# Patient Record
Sex: FEMALE | Race: BLACK OR AFRICAN AMERICAN | NOT HISPANIC OR LATINO | URBAN - METROPOLITAN AREA
[De-identification: names, ages, dates, MRNs, and addresses within clinical notes are randomized per-mention and may not be internally consistent; named-entity substitution may affect disease eponyms.]

---

## 2021-11-15 ENCOUNTER — OUTPATIENT (OUTPATIENT)
Dept: OUTPATIENT SERVICES | Facility: HOSPITAL | Age: 56
LOS: 1 days | End: 2021-11-15
Payer: COMMERCIAL

## 2021-11-15 VITALS
DIASTOLIC BLOOD PRESSURE: 70 MMHG | HEART RATE: 82 BPM | RESPIRATION RATE: 16 BRPM | TEMPERATURE: 97 F | WEIGHT: 158.07 LBS | OXYGEN SATURATION: 97 % | HEIGHT: 62 IN | SYSTOLIC BLOOD PRESSURE: 102 MMHG

## 2021-11-15 DIAGNOSIS — N65.1 DISPROPORTION OF RECONSTRUCTED BREAST: ICD-10-CM

## 2021-11-15 DIAGNOSIS — Z98.890 OTHER SPECIFIED POSTPROCEDURAL STATES: Chronic | ICD-10-CM

## 2021-11-15 DIAGNOSIS — Z90.3 ACQUIRED ABSENCE OF STOMACH [PART OF]: Chronic | ICD-10-CM

## 2021-11-15 DIAGNOSIS — L30.4 ERYTHEMA INTERTRIGO: ICD-10-CM

## 2021-11-15 DIAGNOSIS — Z01.818 ENCOUNTER FOR OTHER PREPROCEDURAL EXAMINATION: ICD-10-CM

## 2021-11-15 DIAGNOSIS — N62 HYPERTROPHY OF BREAST: ICD-10-CM

## 2021-11-15 LAB
A1C WITH ESTIMATED AVERAGE GLUCOSE RESULT: 7.7 % — HIGH (ref 4–5.6)
ANION GAP SERPL CALC-SCNC: 13 MMOL/L — SIGNIFICANT CHANGE UP (ref 5–17)
BUN SERPL-MCNC: 21 MG/DL — SIGNIFICANT CHANGE UP (ref 7–23)
CALCIUM SERPL-MCNC: 9.7 MG/DL — SIGNIFICANT CHANGE UP (ref 8.4–10.5)
CHLORIDE SERPL-SCNC: 103 MMOL/L — SIGNIFICANT CHANGE UP (ref 96–108)
CO2 SERPL-SCNC: 24 MMOL/L — SIGNIFICANT CHANGE UP (ref 22–31)
CREAT SERPL-MCNC: 0.66 MG/DL — SIGNIFICANT CHANGE UP (ref 0.5–1.3)
ESTIMATED AVERAGE GLUCOSE: 174 MG/DL — HIGH (ref 68–114)
GLUCOSE SERPL-MCNC: 80 MG/DL — SIGNIFICANT CHANGE UP (ref 70–99)
HCT VFR BLD CALC: 43.4 % — SIGNIFICANT CHANGE UP (ref 34.5–45)
HGB BLD-MCNC: 14 G/DL — SIGNIFICANT CHANGE UP (ref 11.5–15.5)
MCHC RBC-ENTMCNC: 30.1 PG — SIGNIFICANT CHANGE UP (ref 27–34)
MCHC RBC-ENTMCNC: 32.3 GM/DL — SIGNIFICANT CHANGE UP (ref 32–36)
MCV RBC AUTO: 93.3 FL — SIGNIFICANT CHANGE UP (ref 80–100)
NRBC # BLD: 0 /100 WBCS — SIGNIFICANT CHANGE UP (ref 0–0)
PLATELET # BLD AUTO: 343 K/UL — SIGNIFICANT CHANGE UP (ref 150–400)
POTASSIUM SERPL-MCNC: 4.5 MMOL/L — SIGNIFICANT CHANGE UP (ref 3.5–5.3)
POTASSIUM SERPL-SCNC: 4.5 MMOL/L — SIGNIFICANT CHANGE UP (ref 3.5–5.3)
RBC # BLD: 4.65 M/UL — SIGNIFICANT CHANGE UP (ref 3.8–5.2)
RBC # FLD: 12.2 % — SIGNIFICANT CHANGE UP (ref 10.3–14.5)
SODIUM SERPL-SCNC: 140 MMOL/L — SIGNIFICANT CHANGE UP (ref 135–145)
WBC # BLD: 5.11 K/UL — SIGNIFICANT CHANGE UP (ref 3.8–10.5)
WBC # FLD AUTO: 5.11 K/UL — SIGNIFICANT CHANGE UP (ref 3.8–10.5)

## 2021-11-15 PROCEDURE — 83036 HEMOGLOBIN GLYCOSYLATED A1C: CPT

## 2021-11-15 PROCEDURE — 80048 BASIC METABOLIC PNL TOTAL CA: CPT

## 2021-11-15 PROCEDURE — G0463: CPT

## 2021-11-15 PROCEDURE — 85027 COMPLETE CBC AUTOMATED: CPT

## 2021-11-15 RX ORDER — SODIUM CHLORIDE 9 MG/ML
3 INJECTION INTRAMUSCULAR; INTRAVENOUS; SUBCUTANEOUS EVERY 8 HOURS
Refills: 0 | Status: DISCONTINUED | OUTPATIENT
Start: 2021-11-29 | End: 2021-12-13

## 2021-11-15 RX ORDER — LIDOCAINE HCL 20 MG/ML
0.2 VIAL (ML) INJECTION ONCE
Refills: 0 | Status: DISCONTINUED | OUTPATIENT
Start: 2021-11-29 | End: 2021-12-13

## 2021-11-15 RX ORDER — CEFAZOLIN SODIUM 1 G
2000 VIAL (EA) INJECTION ONCE
Refills: 0 | Status: DISCONTINUED | OUTPATIENT
Start: 2021-11-29 | End: 2021-12-13

## 2021-11-15 NOTE — H&P PST ADULT - NSICDXPASTMEDICALHX_GEN_ALL_CORE_FT
PAST MEDICAL HISTORY:  HLD (hyperlipidemia)     Type 2 diabetes mellitus      PAST MEDICAL HISTORY:  HLD (hyperlipidemia)     Morbid obesity -s/p gastric sleeve    Type 2 diabetes mellitus

## 2021-11-15 NOTE — H&P PST ADULT - HISTORY OF PRESENT ILLNESS
56 year old female with PMH of T2DM, HLD Morbid Obesity, S/P Gastric Sleeve (2018, 50lb weight loss), COVID-19 infection (march 2021-mild symptoms)  She presents to PST today for evaluation prior to scheduled bilateral breast reduction, Panniculectomy on 11/29/2021  She denies fever, chills, nausea/vomiting, abdominal pain    preop covid screen 11/26 @ [facility] 56 year old female with PMH of T2DM, HLD, Morbid Obesity, S/P Gastric Sleeve (2018  with 50lb weight loss), COVID-19 infection (march 2021-mild symptoms) who presents to PST today for evaluation prior to scheduled bilateral breast reduction, Panniculectomy on 11/29/2021  She denies fever, chills, nausea/vomiting, abdominal pain    preop covid screen 11/26 @ [facility]

## 2021-11-15 NOTE — H&P PST ADULT - PROBLEM SELECTOR PLAN 1
Hypertrophy of Breast  -cbc, bmp, A1c done at [facility]  -medical evaluation --scheduled 11/15  -preop instructions  -instructed to hold metformin 24 hours prior to procedure  -advised 20% reduction in tresiba dose on 11/28  -fingerstick on admit

## 2021-11-15 NOTE — H&P PST ADULT - NSICDXPASTSURGICALHX_GEN_ALL_CORE_FT
PAST SURGICAL HISTORY:  H/O gastric sleeve -2018, 50lb weight loss    History of esophagogastroduodenoscopy (EGD)     S/P colonoscopy     Status post embolization of uterine artery

## 2021-11-15 NOTE — H&P PST ADULT - SYMPTOMS
Call to ED pharmacy and they said they don't have this specific medication and would need another order to be sent and go through another prior auth. Therefore I called the patient and ;et him know if he needs the medication soon it might be best to send to pharmacy in [place]. He said he will actually be in [place] so script was sent there instead.   denies/none

## 2021-11-17 PROBLEM — E78.5 HYPERLIPIDEMIA, UNSPECIFIED: Chronic | Status: ACTIVE | Noted: 2021-11-15

## 2021-11-17 PROBLEM — E11.9 TYPE 2 DIABETES MELLITUS WITHOUT COMPLICATIONS: Chronic | Status: ACTIVE | Noted: 2021-11-15

## 2021-11-17 PROBLEM — E66.01 MORBID (SEVERE) OBESITY DUE TO EXCESS CALORIES: Chronic | Status: ACTIVE | Noted: 2021-11-15

## 2021-11-26 ENCOUNTER — OUTPATIENT (OUTPATIENT)
Dept: OUTPATIENT SERVICES | Facility: HOSPITAL | Age: 56
LOS: 1 days | End: 2021-11-26
Payer: COMMERCIAL

## 2021-11-26 DIAGNOSIS — Z98.890 OTHER SPECIFIED POSTPROCEDURAL STATES: Chronic | ICD-10-CM

## 2021-11-26 DIAGNOSIS — Z11.52 ENCOUNTER FOR SCREENING FOR COVID-19: ICD-10-CM

## 2021-11-26 DIAGNOSIS — Z90.3 ACQUIRED ABSENCE OF STOMACH [PART OF]: Chronic | ICD-10-CM

## 2021-11-26 LAB — SARS-COV-2 RNA SPEC QL NAA+PROBE: SIGNIFICANT CHANGE UP

## 2021-11-26 PROCEDURE — C9803: CPT

## 2021-11-26 PROCEDURE — U0003: CPT

## 2021-11-26 PROCEDURE — U0005: CPT

## 2021-11-29 ENCOUNTER — OUTPATIENT (OUTPATIENT)
Dept: OUTPATIENT SERVICES | Facility: HOSPITAL | Age: 56
LOS: 1 days | End: 2021-11-29
Payer: COMMERCIAL

## 2021-11-29 VITALS
HEIGHT: 62 IN | RESPIRATION RATE: 18 BRPM | WEIGHT: 158.07 LBS | HEART RATE: 74 BPM | DIASTOLIC BLOOD PRESSURE: 70 MMHG | OXYGEN SATURATION: 98 % | TEMPERATURE: 98 F | SYSTOLIC BLOOD PRESSURE: 110 MMHG

## 2021-11-29 DIAGNOSIS — Z98.890 OTHER SPECIFIED POSTPROCEDURAL STATES: Chronic | ICD-10-CM

## 2021-11-29 DIAGNOSIS — N62 HYPERTROPHY OF BREAST: ICD-10-CM

## 2021-11-29 DIAGNOSIS — N65.1 DISPROPORTION OF RECONSTRUCTED BREAST: ICD-10-CM

## 2021-11-29 DIAGNOSIS — L30.4 ERYTHEMA INTERTRIGO: ICD-10-CM

## 2021-11-29 DIAGNOSIS — Z90.3 ACQUIRED ABSENCE OF STOMACH [PART OF]: Chronic | ICD-10-CM

## 2021-11-29 LAB
GLUCOSE BLDC GLUCOMTR-MCNC: 195 MG/DL — HIGH (ref 70–99)
GLUCOSE BLDC GLUCOMTR-MCNC: 235 MG/DL — HIGH (ref 70–99)
GLUCOSE BLDC GLUCOMTR-MCNC: 256 MG/DL — HIGH (ref 70–99)
GLUCOSE BLDC GLUCOMTR-MCNC: 70 MG/DL — SIGNIFICANT CHANGE UP (ref 70–99)

## 2021-11-29 PROCEDURE — 88305 TISSUE EXAM BY PATHOLOGIST: CPT | Mod: 26

## 2021-11-29 PROCEDURE — 88302 TISSUE EXAM BY PATHOLOGIST: CPT | Mod: 26

## 2021-11-29 RX ORDER — SODIUM CHLORIDE 9 MG/ML
1000 INJECTION, SOLUTION INTRAVENOUS
Refills: 0 | Status: DISCONTINUED | OUTPATIENT
Start: 2021-11-29 | End: 2021-12-13

## 2021-11-29 RX ORDER — FAMOTIDINE 10 MG/ML
20 INJECTION INTRAVENOUS ONCE
Refills: 0 | Status: COMPLETED | OUTPATIENT
Start: 2021-11-29 | End: 2021-11-29

## 2021-11-29 RX ORDER — HYDROMORPHONE HYDROCHLORIDE 2 MG/ML
1 INJECTION INTRAMUSCULAR; INTRAVENOUS; SUBCUTANEOUS ONCE
Refills: 0 | Status: DISCONTINUED | OUTPATIENT
Start: 2021-11-29 | End: 2021-11-29

## 2021-11-29 RX ORDER — CHLORHEXIDINE GLUCONATE 213 G/1000ML
1 SOLUTION TOPICAL ONCE
Refills: 0 | Status: COMPLETED | OUTPATIENT
Start: 2021-11-29 | End: 2021-11-29

## 2021-11-29 RX ORDER — SIMETHICONE 80 MG/1
80 TABLET, CHEWABLE ORAL
Refills: 0 | Status: DISCONTINUED | OUTPATIENT
Start: 2021-11-29 | End: 2021-12-13

## 2021-11-29 RX ORDER — DEXTROSE 50 % IN WATER 50 %
25 SYRINGE (ML) INTRAVENOUS ONCE
Refills: 0 | Status: DISCONTINUED | OUTPATIENT
Start: 2021-11-29 | End: 2021-12-13

## 2021-11-29 RX ORDER — SEMAGLUTIDE 0.68 MG/ML
1.5 INJECTION, SOLUTION SUBCUTANEOUS
Qty: 0 | Refills: 0 | DISCHARGE

## 2021-11-29 RX ORDER — APREPITANT 80 MG/1
40 CAPSULE ORAL ONCE
Refills: 0 | Status: COMPLETED | OUTPATIENT
Start: 2021-11-29 | End: 2021-11-29

## 2021-11-29 RX ORDER — ATORVASTATIN CALCIUM 80 MG/1
1 TABLET, FILM COATED ORAL
Qty: 0 | Refills: 0 | DISCHARGE

## 2021-11-29 RX ORDER — HYDROMORPHONE HYDROCHLORIDE 2 MG/ML
0.5 INJECTION INTRAMUSCULAR; INTRAVENOUS; SUBCUTANEOUS
Refills: 0 | Status: DISCONTINUED | OUTPATIENT
Start: 2021-11-29 | End: 2021-11-29

## 2021-11-29 RX ORDER — CEFAZOLIN SODIUM 1 G
1000 VIAL (EA) INJECTION EVERY 8 HOURS
Refills: 0 | Status: DISCONTINUED | OUTPATIENT
Start: 2021-11-29 | End: 2021-12-13

## 2021-11-29 RX ORDER — ENOXAPARIN SODIUM 100 MG/ML
40 INJECTION SUBCUTANEOUS ONCE
Refills: 0 | Status: COMPLETED | OUTPATIENT
Start: 2021-11-30 | End: 2021-11-30

## 2021-11-29 RX ORDER — ACETAMINOPHEN 500 MG
1000 TABLET ORAL ONCE
Refills: 0 | Status: COMPLETED | OUTPATIENT
Start: 2021-11-29 | End: 2021-11-29

## 2021-11-29 RX ORDER — ONDANSETRON 8 MG/1
4 TABLET, FILM COATED ORAL ONCE
Refills: 0 | Status: DISCONTINUED | OUTPATIENT
Start: 2021-11-29 | End: 2021-12-13

## 2021-11-29 RX ORDER — INSULIN LISPRO 100/ML
VIAL (ML) SUBCUTANEOUS AT BEDTIME
Refills: 0 | Status: DISCONTINUED | OUTPATIENT
Start: 2021-11-29 | End: 2021-12-13

## 2021-11-29 RX ORDER — METFORMIN HYDROCHLORIDE 850 MG/1
1 TABLET ORAL
Qty: 0 | Refills: 0 | DISCHARGE

## 2021-11-29 RX ORDER — OXYCODONE HYDROCHLORIDE 5 MG/1
5 TABLET ORAL EVERY 4 HOURS
Refills: 0 | Status: DISCONTINUED | OUTPATIENT
Start: 2021-11-29 | End: 2021-11-29

## 2021-11-29 RX ORDER — TRAMADOL HYDROCHLORIDE 50 MG/1
25 TABLET ORAL EVERY 6 HOURS
Refills: 0 | Status: DISCONTINUED | OUTPATIENT
Start: 2021-11-29 | End: 2021-11-29

## 2021-11-29 RX ORDER — GLUCAGON INJECTION, SOLUTION 0.5 MG/.1ML
1 INJECTION, SOLUTION SUBCUTANEOUS ONCE
Refills: 0 | Status: DISCONTINUED | OUTPATIENT
Start: 2021-11-29 | End: 2021-12-13

## 2021-11-29 RX ORDER — ACETAMINOPHEN 500 MG
1000 TABLET ORAL ONCE
Refills: 0 | Status: COMPLETED | OUTPATIENT
Start: 2021-11-30 | End: 2021-11-30

## 2021-11-29 RX ORDER — TRAMADOL HYDROCHLORIDE 50 MG/1
50 TABLET ORAL EVERY 6 HOURS
Refills: 0 | Status: DISCONTINUED | OUTPATIENT
Start: 2021-11-29 | End: 2021-11-30

## 2021-11-29 RX ORDER — DEXTROSE 50 % IN WATER 50 %
12.5 SYRINGE (ML) INTRAVENOUS ONCE
Refills: 0 | Status: DISCONTINUED | OUTPATIENT
Start: 2021-11-29 | End: 2021-12-13

## 2021-11-29 RX ORDER — INSULIN LISPRO 100/ML
VIAL (ML) SUBCUTANEOUS
Refills: 0 | Status: DISCONTINUED | OUTPATIENT
Start: 2021-11-29 | End: 2021-12-13

## 2021-11-29 RX ORDER — DEXTROSE 50 % IN WATER 50 %
15 SYRINGE (ML) INTRAVENOUS ONCE
Refills: 0 | Status: DISCONTINUED | OUTPATIENT
Start: 2021-11-29 | End: 2021-12-13

## 2021-11-29 RX ORDER — INSULIN DEGLUDEC 100 U/ML
40 INJECTION, SOLUTION SUBCUTANEOUS
Qty: 0 | Refills: 0 | DISCHARGE

## 2021-11-29 RX ADMIN — Medication 1000 MILLIGRAM(S): at 21:00

## 2021-11-29 RX ADMIN — FAMOTIDINE 20 MILLIGRAM(S): 10 INJECTION INTRAVENOUS at 19:57

## 2021-11-29 RX ADMIN — SODIUM CHLORIDE 3 MILLILITER(S): 9 INJECTION INTRAMUSCULAR; INTRAVENOUS; SUBCUTANEOUS at 13:12

## 2021-11-29 RX ADMIN — Medication 100 MILLIGRAM(S): at 16:00

## 2021-11-29 RX ADMIN — SODIUM CHLORIDE 3 MILLILITER(S): 9 INJECTION INTRAMUSCULAR; INTRAVENOUS; SUBCUTANEOUS at 21:51

## 2021-11-29 RX ADMIN — Medication 400 MILLIGRAM(S): at 20:29

## 2021-11-29 RX ADMIN — APREPITANT 40 MILLIGRAM(S): 80 CAPSULE ORAL at 06:31

## 2021-11-29 RX ADMIN — TRAMADOL HYDROCHLORIDE 50 MILLIGRAM(S): 50 TABLET ORAL at 23:50

## 2021-11-29 RX ADMIN — HYDROMORPHONE HYDROCHLORIDE 1 MILLIGRAM(S): 2 INJECTION INTRAMUSCULAR; INTRAVENOUS; SUBCUTANEOUS at 17:13

## 2021-11-29 RX ADMIN — CHLORHEXIDINE GLUCONATE 1 APPLICATION(S): 213 SOLUTION TOPICAL at 06:32

## 2021-11-29 RX ADMIN — Medication 3: at 19:33

## 2021-11-29 NOTE — ASU DISCHARGE PLAN (ADULT/PEDIATRIC) - NURSING INSTRUCTIONS
You were given Tylenol during your visit, the next dose of Tylenol will be after ________12pm___ ,today and every 6 hours afterwards for pain management, do not take any Tylenol containing products until this time. Do not exceed more than 4000mg of Tylenol in one 24 hour setting. If no contraindications, you may alternate with Ibuprofen or Naproxen 3 hours after dose of Tylenol. Ibuprofen can be taken every 6 hours. Naproxen may be taken every 12 hours.

## 2021-11-29 NOTE — BRIEF OPERATIVE NOTE - NSICDXBRIEFPOSTOP_GEN_ALL_CORE_FT
POST-OP DIAGNOSIS:  Macromastia 29-Nov-2021 12:33:27  Candido Levy  Excessive or redundant skin or subcutaneous tissue 29-Nov-2021 12:35:03  Candido Levy

## 2021-11-29 NOTE — ASU DISCHARGE PLAN (ADULT/PEDIATRIC) - CARE PROVIDER_API CALL
Candido Levy)  Plastic Surgery  [address] [place] [address], [address] [place] [id number]  Phone: [phone]  Fax: [phone]  Follow Up Time:

## 2021-11-29 NOTE — BRIEF OPERATIVE NOTE - NSICDXBRIEFPREOP_GEN_ALL_CORE_FT
PRE-OP DIAGNOSIS:  Macromastia 29-Nov-2021 12:35:11  Candido Levy  Excessive or redundant skin or subcutaneous tissue 29-Nov-2021 12:35:08  Candido Levy

## 2021-11-29 NOTE — CHART NOTE - NSCHARTNOTEFT_GEN_A_CORE
General Surgery Post op Check    Pt seen and examined without complaints. Pain is controlled. Denies SOB/CP/N/V.     Vital Signs Last 24 Hrs  T(C): 36.2 (29 Nov 2021 19:00), Max: 36.5 (29 Nov 2021 06:16)  T(F): 97.2 (29 Nov 2021 19:00), Max: 97.7 (29 Nov 2021 06:16)  HR: 67 (29 Nov 2021 19:30) (67 - 86)  BP: 132/70 (29 Nov 2021 19:30) (98/57 - 132/70)  BP(mean): --  RR: 18 (29 Nov 2021 19:30) (13 - 25)  SpO2: 93% (29 Nov 2021 19:30) (93% - 99%)    I&O's Summary    29 Nov 2021 07:01  -  29 Nov 2021 20:05  --------------------------------------------------------  IN: 130 mL / OUT: 431.5 mL / NET: -301.5 mL        Physical Exam  Gen: NAD, A&Ox3    Pulm: No respiratory distress, no subcostal retractions    Dressing: Breast dressing c/d/i, Support bra in place  Drains x 2 DOROTHY on Suction    CV: RRR, no JVD    Abd: Soft, NT, ND  Dressing: Abdominal binda, soft dressing c/d/i  Drains: x2 DOROTHY on suction  Extremities:  FROM, warm and well perfused, equal bilateral muscle strength      A/P: 56y Female s/p Bilateral Breast reduction, Panniculectomy and abdominoplasty.  VSS and in No Acute Distress  -Pain Control  -DVT prophylaxis w/ SCD.  Encouraged OOB  -Strict I&O's  -Monitor DOROTHY drain output  -Analgesia and antiemetics as needed  -Regular Diet  -Monitor overnight  -Dispo:  Discharge home in JUWAN Doty PA-C  Ambulatory Surgery General Surgery Post op Check    Pt seen and examined without complaints. Pain is controlled. Denies SOB/CP/N/V.     Vital Signs Last 24 Hrs  T(C): 36.2 (29 Nov 2021 19:00), Max: 36.5 (29 Nov 2021 06:16)  T(F): 97.2 (29 Nov 2021 19:00), Max: 97.7 (29 Nov 2021 06:16)  HR: 67 (29 Nov 2021 19:30) (67 - 86)  BP: 132/70 (29 Nov 2021 19:30) (98/57 - 132/70)  BP(mean): --  RR: 18 (29 Nov 2021 19:30) (13 - 25)  SpO2: 93% (29 Nov 2021 19:30) (93% - 99%)    I&O's Summary    29 Nov 2021 07:01  -  29 Nov 2021 20:05  --------------------------------------------------------  IN: 130 mL / OUT: 431.5 mL / NET: -301.5 mL        Physical Exam  Gen: NAD, A&Ox3    Pulm: No respiratory distress, no subcostal retractions    Dressing: Breast dressing c/d/i, Support bra in place  Drains x 2 DOROTHY on Suction    CV: RRR, no JVD    Abd: Mildly sore and tenderness most likely incisional discomfort.  Dressing: Abdominal binda, soft dressing c/d/i  Drains: x2 DOROTHY on suction  Extremities:  FROM, warm and well perfused, equal bilateral muscle strength      A/P: 56y Female s/p Bilateral Breast reduction, Panniculectomy and abdominoplasty.  VSS and in No Acute Distress  -Pain Control  -DVT prophylaxis w/ SCD.  Encouraged OOB  -Strict I&O's  -Monitor DOROTHY drain output  -Analgesia and antiemetics as needed  -Regular Diet  -Monitor overnight  -Dispo:  Discharge home in AM    Sumanth Doty PA-C  Ambulatory Surgery

## 2021-11-29 NOTE — ASU PATIENT PROFILE, ADULT - NSICDXPASTMEDICALHX_GEN_ALL_CORE_FT
PAST MEDICAL HISTORY:  HLD (hyperlipidemia)     Morbid obesity -s/p gastric sleeve    Type 2 diabetes mellitus

## 2021-11-29 NOTE — BRIEF OPERATIVE NOTE - NSICDXBRIEFPROCEDURE_GEN_ALL_CORE_FT
PROCEDURES:  Breast reduction, bilateral 29-Nov-2021 12:35:33  Candido Levy  Panniculectomy 29-Nov-2021 12:35:46  Candido Levy  Abdominoplasty for repair of diastasis recti 29-Nov-2021 12:36:24  Candido Levy

## 2021-11-29 NOTE — ASU DISCHARGE PLAN (ADULT/PEDIATRIC) - CALL YOUR DOCTOR IF YOU HAVE ANY OF THE FOLLOWING:
Bleeding that does not stop/Fever greater than (need to indicate Fahrenheit or Celsius)/Wound/Surgical Site with redness, or foul smelling discharge or pus/Unable to urinate/Inability to tolerate liquids or foods

## 2021-11-30 VITALS
TEMPERATURE: 98 F | RESPIRATION RATE: 17 BRPM | SYSTOLIC BLOOD PRESSURE: 122 MMHG | HEART RATE: 78 BPM | OXYGEN SATURATION: 95 % | DIASTOLIC BLOOD PRESSURE: 59 MMHG

## 2021-11-30 LAB — GLUCOSE BLDC GLUCOMTR-MCNC: 166 MG/DL — HIGH (ref 70–99)

## 2021-11-30 PROCEDURE — 15830 EXC EXCESSIVE SKIN ABDOMEN: CPT

## 2021-11-30 PROCEDURE — 88302 TISSUE EXAM BY PATHOLOGIST: CPT

## 2021-11-30 PROCEDURE — C9399: CPT

## 2021-11-30 PROCEDURE — 15834 EXC EXCESSIVE SKIN HIP: CPT | Mod: 50

## 2021-11-30 PROCEDURE — 82962 GLUCOSE BLOOD TEST: CPT

## 2021-11-30 PROCEDURE — 19318 BREAST REDUCTION: CPT | Mod: 50

## 2021-11-30 PROCEDURE — 88305 TISSUE EXAM BY PATHOLOGIST: CPT

## 2021-11-30 RX ORDER — TRAMADOL HYDROCHLORIDE 50 MG/1
1 TABLET ORAL
Qty: 20 | Refills: 0
Start: 2021-11-30 | End: 2021-12-04

## 2021-11-30 RX ADMIN — Medication 400 MILLIGRAM(S): at 04:09

## 2021-11-30 RX ADMIN — ENOXAPARIN SODIUM 40 MILLIGRAM(S): 100 INJECTION SUBCUTANEOUS at 06:01

## 2021-11-30 RX ADMIN — TRAMADOL HYDROCHLORIDE 50 MILLIGRAM(S): 50 TABLET ORAL at 08:20

## 2021-11-30 RX ADMIN — Medication 100 MILLIGRAM(S): at 04:09

## 2021-11-30 RX ADMIN — Medication 1000 MILLIGRAM(S): at 04:30

## 2021-11-30 RX ADMIN — SODIUM CHLORIDE 3 MILLILITER(S): 9 INJECTION INTRAMUSCULAR; INTRAVENOUS; SUBCUTANEOUS at 06:31

## 2021-11-30 RX ADMIN — TRAMADOL HYDROCHLORIDE 50 MILLIGRAM(S): 50 TABLET ORAL at 07:49

## 2021-11-30 RX ADMIN — Medication 1: at 06:07

## 2021-11-30 RX ADMIN — SIMETHICONE 80 MILLIGRAM(S): 80 TABLET, CHEWABLE ORAL at 06:01

## 2021-11-30 RX ADMIN — TRAMADOL HYDROCHLORIDE 50 MILLIGRAM(S): 50 TABLET ORAL at 00:30

## 2021-11-30 NOTE — PROGRESS NOTE ADULT - ASSESSMENT
Assessment/Plan:  s/p Bilateral breast reduction, panniculectomy and abdominal plasty, POD #1; VSS.  NAD  - Incentive spirometer  - Monitor and record drain output x4 as instructed  - Pain control  - DVT ppx: Lovenox 40mg SQ daily, OOB  - Dispo:  Discharge home this AM  - Outpatient follow up with surgeon.     Sumanth Doty PA-C  Ambulatory Surgery

## 2021-11-30 NOTE — PROGRESS NOTE ADULT - SUBJECTIVE AND OBJECTIVE BOX
SURGERY DAILY PROGRESS NOTE:   s/p Bilateral breast reduction, panniculectomy and abdominal plasty, POD #1      SUBJECTIVE/ROS: Patient feels well.  No events overnight. States pain controlled with analgesia.   Denies nausea, vomiting, chest pain, shortness of breath         MEDICATIONS  (STANDING):  ceFAZolin   IVPB 2000 milliGRAM(s) IV Intermittent once  ceFAZolin   IVPB 1000 milliGRAM(s) IV Intermittent every 8 hours  dextrose 40% Gel 15 Gram(s) Oral once  dextrose 5%. 1000 milliLiter(s) (50 mL/Hr) IV Continuous <Continuous>  dextrose 5%. 1000 milliLiter(s) (100 mL/Hr) IV Continuous <Continuous>  dextrose 50% Injectable 25 Gram(s) IV Push once  dextrose 50% Injectable 12.5 Gram(s) IV Push once  dextrose 50% Injectable 25 Gram(s) IV Push once  glucagon  Injectable 1 milliGRAM(s) IntraMuscular once  insulin lispro (ADMELOG) corrective regimen sliding scale   SubCutaneous three times a day before meals  insulin lispro (ADMELOG) corrective regimen sliding scale   SubCutaneous at bedtime  lidocaine 1% Injectable 0.2 milliLiter(s) Local Injection once  sodium chloride 0.9% lock flush 3 milliLiter(s) IV Push every 8 hours    MEDICATIONS  (PRN):  HYDROmorphone  Injectable 0.5 milliGRAM(s) IV Push every 10 minutes PRN Moderate Pain (4 - 6)  ondansetron Injectable 4 milliGRAM(s) IV Push once PRN Nausea and/or Vomiting  oxyCODONE    IR 5 milliGRAM(s) Oral every 4 hours PRN Severe Pain (7 - 10)  simethicone 80 milliGRAM(s) Chew five times a day PRN Upset Stomach  traMADol 25 milliGRAM(s) Oral every 6 hours PRN Mild Pain (1 - 3)  traMADol 50 milliGRAM(s) Oral every 6 hours PRN Moderate Pain (4 - 6)      OBJECTIVE:    Vital Signs Last 24 Hrs  T(C): 36.4 (29 Nov 2021 20:15), Max: 36.4 (29 Nov 2021 20:15)  T(F): 97.5 (29 Nov 2021 20:15), Max: 97.5 (29 Nov 2021 20:15)  HR: 83 (29 Nov 2021 21:00) (67 - 86)  BP: 120/69 (29 Nov 2021 21:00) (98/57 - 132/70)  BP(mean): 81 (29 Nov 2021 21:00) (81 - 82)  RR: 16 (29 Nov 2021 21:00) (13 - 25)  SpO2: 97% (29 Nov 2021 21:00) (93% - 99%)        I&O's Detail    29 Nov 2021 07:01  -  30 Nov 2021 06:51  --------------------------------------------------------  IN:    IV PiggyBack: 300 mL    Oral Fluid: 370 mL  Total IN: 670 mL    OUT:    Bulb (mL): 65 mL    Bulb (mL): 47.5 mL    Bulb (mL): 47 mL    Bulb (mL): 42 mL    Indwelling Catheter - Urethral (mL): 300 mL    Voided (mL): 750 mL  Total OUT: 1251.5 mL    Total NET: -581.5 mL          Daily     Daily     LABS:                        PHYSICAL EXAM:  Constitutional: well developed, well nourished, NAD  Eyes: anicteric  ENMT: normal facies, symmetric  Neck: supple  Respiratory: CTA bilaterally  Cardiovascular: RRR  Gastrointestinal: +BS, abdomen soft, nontender, nondistended. No peritoneal signs  Extremities: FROM, warm  Skin/Incision: dressing clean/dry/intact  Musculoskeletal: equal strength bilateral upper and lower extremities  Psychiatric: oriented x 3; appropriate

## 2021-12-13 LAB — SURGICAL PATHOLOGY STUDY: SIGNIFICANT CHANGE UP

## 2021-12-31 ENCOUNTER — EMERGENCY (EMERGENCY)
Facility: HOSPITAL | Age: 56
LOS: 1 days | Discharge: HOME | End: 2021-12-31
Attending: EMERGENCY MEDICINE
Payer: COMMERCIAL

## 2021-12-31 VITALS
RESPIRATION RATE: 16 BRPM | OXYGEN SATURATION: 98 % | TEMPERATURE: 98 F | WEIGHT: 141.98 LBS | HEIGHT: 62 IN | DIASTOLIC BLOOD PRESSURE: 65 MMHG | HEART RATE: 98 BPM | SYSTOLIC BLOOD PRESSURE: 109 MMHG

## 2021-12-31 DIAGNOSIS — Z98.890 OTHER SPECIFIED POSTPROCEDURAL STATES: Chronic | ICD-10-CM

## 2021-12-31 DIAGNOSIS — Z90.3 ACQUIRED ABSENCE OF STOMACH [PART OF]: Chronic | ICD-10-CM

## 2021-12-31 PROCEDURE — 99282 EMERGENCY DEPT VISIT SF MDM: CPT

## 2021-12-31 PROCEDURE — G0463: CPT

## 2021-12-31 NOTE — ED PROVIDER NOTE - PATIENT PORTAL LINK FT
You can access the FollowMyHealth Patient Portal offered by [facility] by registering at the following website: [email or web address]. By joining [employer]’s FollowMyHealth portal, you will also be able to view your health information using other applications (apps) compatible with our system.

## 2021-12-31 NOTE — ED PROVIDER NOTE - NSFOLLOWUPINSTRUCTIONS_ED_ALL_ED_FT
Tissue Adhesive Wound Care      Some cuts and wounds can be closed with skin glue (tissue adhesive). Skin glue holds the skin together and helps your wound heal faster. Skin glue goes away on its own as your wound gets better. It is important to take good care of your wound at home while it heals.      Follow these instructions at home:      Wound care                   •If a bandage (dressing) was put on the wound, keep it clean and dry.    •Follow instructions from your doctor about how often to change the bandage.  •Wash your hands for at least 20 seconds with soap and water before and after you change your bandage. If you cannot use soap and water, use hand .      •Change the bandage as often as told by your doctor.      •Leave skin glue in place. It will fall off on its own after 7–10 days.        • Do not scratch, rub, or pick at the skin glue.      • Do not put tape over the skin glue. The skin glue could come off when you take the tape off.      •Protect the wound from another injury.    •Check your wound area every day for signs of infection. Check for:  •More redness, swelling, or pain.      •Fluid or blood.      •Warmth.      •Pus or a bad smell.        Bathing   • Do not take baths, swim, or use a hot tub until your doctor approves. You may only be allowed to take sponge baths. Ask your doctor if you may take showers.  •Showers are usually allowed 24 hours after treatment.      •Cover the dressing with a watertight covering when you take a shower.        • Do not soak the area where skin glue has been used.      • Do not use soaps or creams on your wound.      Eating and drinking   •Eat healthy foods to help the wound heal. As told by your doctor, eat a diet that includes protein, vitamin A, vitamin C, and other nutrients. You should eat:  •Foods rich in protein. These include meat, fish, eggs, dairy, beans, and nuts.      •Foods rich in vitamin A. These include carrots and dark green, leafy vegetables.      •Foods rich in vitamin C. These include oranges, tomatoes, broccoli, and peppers.        •Drink enough fluid to keep your pee (urine) pale yellow.        General instructions    •Protect your wound from the sun when you are outside for the first 6 months, or for as long as told by your doctor. Put on sunscreen with an SPF of 30 or higher around the scar, or cover it up.      •Take over-the-counter and prescription medicines only as told by your doctor.      • Do not use any products that contain nicotine or tobacco, such as cigarettes, e-cigarettes, and chewing tobacco. These can delay wound healing. If you need help quitting, ask your doctor.      •Keep all follow-up visits as told by your doctor. This is important.        Contact a doctor if:    •The glue used on your wound gets soaked with blood or falls off before your wound has healed. The glue may need to be replaced.      •You have a fever or chills.        Get help right away if:    •Your wound breaks open.    •You have any of these signs of infection:  •More redness, swelling, or pain around your wound.      •A red streak at the area around your wound.      •Fluid or blood coming from your wound.      •Warmth coming from your wound.      •Pus or a bad smell coming from your wound.        •You get a rash after the glue is put on.        Summary    •Some cuts and wounds can be closed with skin glue (tissue adhesive). Skin glue holds the skin together and helps your wound heal faster.      •It is important to take good care of your wound at home while it heals.      •Wash your hands for at least 20 seconds with soap and water before and after you change your bandage.      •Eat healthy foods.      •Check your wound every day for signs of infection.      This information is not intended to replace advice given to you by your health care provider. Make sure you discuss any questions you have with your health care provider.

## 2021-12-31 NOTE — ED ADULT NURSE NOTE - OBJECTIVE STATEMENT
States she was sent by her surgeon for wound check to bilateral breasts .S/P Bilateral breasts reduction 11/29/21

## 2023-09-14 NOTE — ED ADULT NURSE NOTE - HAVE YOU HAD A FIRST COVID-19 BOOSTER?
PRE-OP DIAGNOSIS:  Squamous cell carcinoma in situ of skin of forehead 14-Sep-2023 16:14:14  ELENA Valle  
No